# Patient Record
Sex: FEMALE | Race: WHITE
[De-identification: names, ages, dates, MRNs, and addresses within clinical notes are randomized per-mention and may not be internally consistent; named-entity substitution may affect disease eponyms.]

---

## 2022-07-18 PROBLEM — Z00.00 ENCOUNTER FOR PREVENTIVE HEALTH EXAMINATION: Status: ACTIVE | Noted: 2022-07-18

## 2022-10-04 ENCOUNTER — APPOINTMENT (OUTPATIENT)
Dept: ORTHOPEDIC SURGERY | Facility: CLINIC | Age: 57
End: 2022-10-04

## 2022-10-04 VITALS — BODY MASS INDEX: 18.65 KG/M2 | WEIGHT: 95 LBS | HEIGHT: 60 IN

## 2022-10-04 DIAGNOSIS — Z78.9 OTHER SPECIFIED HEALTH STATUS: ICD-10-CM

## 2022-10-04 DIAGNOSIS — S63.509A UNSPECIFIED SPRAIN OF UNSPECIFIED WRIST, INITIAL ENCOUNTER: ICD-10-CM

## 2022-10-04 PROCEDURE — 99213 OFFICE O/P EST LOW 20 MIN: CPT

## 2022-10-05 PROBLEM — S63.509A WRIST SPRAIN: Status: ACTIVE | Noted: 2022-10-04

## 2022-10-05 NOTE — HISTORY OF PRESENT ILLNESS
[de-identified] : 56M RHD presents with 1-2 weeks of right wrist bump, minimal pain/discomfort. No numbness/tingling. No trauma. Thinks\par bump has increased and decreased in size. No constitutional symptoms.\par \par 10/4/22: f/u right wrist bump. Reports did go away and returned again August 2022. Reports now feeling pain with the mass. She reports it looks like it is connected to the vein. Denies numbness/tingling.

## 2022-10-05 NOTE — IMAGING
[de-identified] : RIGHT HAND EXAM\par +ttp at thumb CMC, and at SL/4th extensor compartment. Volar radial ganglion, nontender\par Skin intact\par No deformity, edema, ecchymosis\par Warm and well perfused\par Brisk capillary refill throughout\par Motor function intact AIN, PIN, and ulnar nerves\par Sensation intact to light touch in median, ulnar, and radial nerves\par Strength with , finger abduction, wrist flexion, wrist extension 5/5\par Finger and wrist motion is intact and full\par Patient is able to make a composite fist\par

## 2022-10-05 NOTE — ASSESSMENT
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 Discussed ganglion does not appear to be the pain generator.\par \par F/u prn